# Patient Record
Sex: MALE | Race: AMERICAN INDIAN OR ALASKA NATIVE
[De-identification: names, ages, dates, MRNs, and addresses within clinical notes are randomized per-mention and may not be internally consistent; named-entity substitution may affect disease eponyms.]

---

## 2017-06-22 ENCOUNTER — HOSPITAL ENCOUNTER (EMERGENCY)
Dept: HOSPITAL 5 - ED | Age: 27
Discharge: HOME | End: 2017-06-22
Payer: MEDICARE

## 2017-06-22 VITALS — SYSTOLIC BLOOD PRESSURE: 116 MMHG | DIASTOLIC BLOOD PRESSURE: 66 MMHG

## 2017-06-22 DIAGNOSIS — F17.200: ICD-10-CM
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
## 2017-06-22 DIAGNOSIS — M54.5: Primary | ICD-10-CM
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
## 2017-06-22 DIAGNOSIS — M54.2: ICD-10-CM
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
## 2017-06-22 PROCEDURE — 99283 EMERGENCY DEPT VISIT LOW MDM: CPT

## 2017-06-22 PROCEDURE — 72100 X-RAY EXAM L-S SPINE 2/3 VWS: CPT

## 2017-06-22 PROCEDURE — 72040 X-RAY EXAM NECK SPINE 2-3 VW: CPT

## 2017-06-22 NOTE — XRAY REPORT
FINAL REPORT



EXAM:  XR SPINE CERVICAL 2-3V



HISTORY:  fall, neck pain 



COMPARISONS:  None.



FINDINGS:  

The three-view cervical spine 



There is straightening of the cervical spine. Vertebral body

heights intervertebral disc spaces are preserved. Prevertebral

soft tissues are within normal limits. No fractures. Incomplete

evaluation of the lung apices is unremarkable. 



IMPRESSION:  

Straightening of the cervical spine, which may be secondary to

muscular spasm or positioning. No fracture identified. Consider

MRI follow-up as warranted.

## 2017-06-22 NOTE — XRAY REPORT
FINAL REPORT



EXAM:  XR SPINE LUMBOSACRAL 2-3V



HISTORY:  fall, low middle back pain 



TECHNIQUE:  Three views lumbar spine 



PRIORS:  None.



FINDINGS:  

Lumbar lordosis is intact.  Vertebral body heights and

intervertebral disc spaces are preserved.  No listhesis,

spondylolysis or other fracture. 



IMPRESSION:  

Unremarkable lumbar spine radiographs.
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
## 2021-12-05 ENCOUNTER — HOSPITAL ENCOUNTER (EMERGENCY)
Dept: HOSPITAL 5 - ED | Age: 31
Discharge: HOME | End: 2021-12-05
Payer: MEDICARE

## 2021-12-05 VITALS — DIASTOLIC BLOOD PRESSURE: 61 MMHG | SYSTOLIC BLOOD PRESSURE: 124 MMHG

## 2021-12-05 DIAGNOSIS — R07.9: Primary | ICD-10-CM

## 2021-12-05 DIAGNOSIS — F17.200: ICD-10-CM

## 2021-12-05 LAB
ALBUMIN SERPL-MCNC: 4.2 G/DL (ref 3.9–5)
ALT SERPL-CCNC: 15 UNITS/L (ref 7–56)
BASOPHILS # (AUTO): 0 K/MM3 (ref 0–0.1)
BASOPHILS NFR BLD AUTO: 0.5 % (ref 0–1.8)
BUN SERPL-MCNC: 14 MG/DL (ref 9–20)
BUN/CREAT SERPL: 13 %
CALCIUM SERPL-MCNC: 9.4 MG/DL (ref 8.4–10.2)
EOSINOPHIL # BLD AUTO: 0 K/MM3 (ref 0–0.4)
EOSINOPHIL NFR BLD AUTO: 0.4 % (ref 0–4.3)
HCT VFR BLD CALC: 46.2 % (ref 35.5–45.6)
HEMOLYSIS INDEX: 39
HGB BLD-MCNC: 14.8 GM/DL (ref 11.8–15.2)
LYMPHOCYTES # BLD AUTO: 1.7 K/MM3 (ref 1.2–5.4)
LYMPHOCYTES NFR BLD AUTO: 34.7 % (ref 13.4–35)
MCHC RBC AUTO-ENTMCNC: 32 % (ref 32–34)
MCV RBC AUTO: 98 FL (ref 84–94)
MONOCYTES # (AUTO): 0.2 K/MM3 (ref 0–0.8)
MONOCYTES % (AUTO): 4.8 % (ref 0–7.3)
PLATELET # BLD: 246 K/MM3 (ref 140–440)
RBC # BLD AUTO: 4.7 M/MM3 (ref 3.65–5.03)

## 2021-12-05 PROCEDURE — 71046 X-RAY EXAM CHEST 2 VIEWS: CPT

## 2021-12-05 PROCEDURE — 84484 ASSAY OF TROPONIN QUANT: CPT

## 2021-12-05 PROCEDURE — 36415 COLL VENOUS BLD VENIPUNCTURE: CPT

## 2021-12-05 PROCEDURE — 80053 COMPREHEN METABOLIC PANEL: CPT

## 2021-12-05 PROCEDURE — 85025 COMPLETE CBC W/AUTO DIFF WBC: CPT

## 2021-12-05 PROCEDURE — 83880 ASSAY OF NATRIURETIC PEPTIDE: CPT

## 2021-12-05 PROCEDURE — 99284 EMERGENCY DEPT VISIT MOD MDM: CPT

## 2021-12-05 PROCEDURE — 85379 FIBRIN DEGRADATION QUANT: CPT

## 2021-12-05 PROCEDURE — 93005 ELECTROCARDIOGRAM TRACING: CPT

## 2021-12-05 NOTE — EMERGENCY DEPARTMENT REPORT
ED Chest Pain HPI





- General


Chief Complaint: Chest Pain


Stated Complaint: CHEST PAIN


Time Seen by Provider: 12/05/21 17:05


Source: patient


Mode of arrival: Ambulatory


Limitations: No Limitations





- History of Present Illness


Initial Comments: 





Patient is a 31-year-old male presents emergency room complaints of left-sided 

chest pain that began today while he was playing a video game.  He states he 

felt some tingling in his left arm.  He states it felt like a tightness 

sensation and he had some mild shortness of breath.  He denies any diaphoresis, 

nausea, vomiting, diarrhea, cough, hemoptysis, leg swelling, calf pain.  No past

medical history.  No allergies to medications.  He denies any tobacco use.  He 

endorses marijuana use.  He denies any family cardiac history.  He denies any 

recent travel, recent surgery, recent immobilization.





- Related Data


                                  Previous Rx's











 Medication  Instructions  Recorded  Last Taken  Type


 


Cyclobenzaprine [Flexeril] 10 mg PO BID PRN #20 tablet 06/22/17 Unknown Rx


 


Naproxen [Naprosyn TAB] 500 mg PO BID #20 tablet 06/22/17 Unknown Rx


 


traMADoL [Ultram 50 MG tab] 50 mg PO Q6HR PRN #20 tablet 06/22/17 Unknown Rx











                                    Allergies











Allergy/AdvReac Type Severity Reaction Status Date / Time


 


No Known Allergies Allergy   Verified 06/22/17 15:55














Heart Score





- HEART Score


History: Moderately suspicious


EKG: Normal


Age: < 45


Risk factors: 1-2 risk factors


Troponin: < normal limit


HEART Score: 2





- EKG Read Time


Time EKG Completed: 16:31


EKG Read Time: 16:42





ED Review of Systems


ROS: 


Stated complaint: CHEST PAIN


Other details as noted in HPI





Comment: All other systems reviewed and negative





ED Past Medical Hx





- Social History


Smoking Status: Current Some Day Smoker


Substance Use Type: Alcohol





- Medications


Home Medications: 


                                Home Medications











 Medication  Instructions  Recorded  Confirmed  Last Taken  Type


 


Cyclobenzaprine [Flexeril] 10 mg PO BID PRN #20 tablet 06/22/17  Unknown Rx


 


Naproxen [Naprosyn TAB] 500 mg PO BID #20 tablet 06/22/17  Unknown Rx


 


traMADoL [Ultram 50 MG tab] 50 mg PO Q6HR PRN #20 tablet 06/22/17  Unknown Rx














ED Physical Exam





- General


Limitations: No Limitations


General appearance: alert, in no apparent distress





- Head


Head exam: Present: atraumatic, normocephalic





- Eye


Eye exam: Present: normal appearance





- ENT


ENT exam: Present: mucous membranes moist





- Respiratory


Respiratory exam: Present: normal lung sounds bilaterally.  Absent: respiratory 

distress, wheezes, rales, rhonchi, stridor, chest wall tenderness, accessory 

muscle use, decreased breath sounds, prolonged expiratory





- Cardiovascular


Cardiovascular Exam: Present: regular rate, normal rhythm, normal heart sounds. 

Absent: systolic murmur, diastolic murmur, rubs, gallop





- Neurological Exam


Neurological exam: Present: alert, oriented X3





- Psychiatric


Psychiatric exam: Present: normal affect, normal mood





- Skin


Skin exam: Present: warm, dry, intact





ED Course


                                   Vital Signs











  12/05/21





  16:25


 


Temperature 98.4 F


 


Pulse Rate 65


 


Respiratory 20





Rate 


 


Blood Pressure 148/72





[Right] 


 


O2 Sat by Pulse 99





Oximetry 














ED Medical Decision Making





- Lab Data


Result diagrams: 


                                 12/05/21 17:35





                                 12/05/21 17:35








                                   Lab Results











  12/05/21 12/05/21 12/05/21 Range/Units





  17:35 17:35 17:35 


 


WBC  5.0    (4.5-11.0)  K/mm3


 


RBC  4.70    (3.65-5.03)  M/mm3


 


Hgb  14.8    (11.8-15.2)  gm/dl


 


Hct  46.2 H    (35.5-45.6)  %


 


MCV  98 H    (84-94)  fl


 


MCH  32    (28-32)  pg


 


MCHC  32    (32-34)  %


 


RDW  13.6    (13.2-15.2)  %


 


Plt Count  246    (140-440)  K/mm3


 


Lymph % (Auto)  34.7    (13.4-35.0)  %


 


Mono % (Auto)  4.8    (0.0-7.3)  %


 


Eos % (Auto)  0.4    (0.0-4.3)  %


 


Baso % (Auto)  0.5    (0.0-1.8)  %


 


Lymph # (Auto)  1.7    (1.2-5.4)  K/mm3


 


Mono # (Auto)  0.2    (0.0-0.8)  K/mm3


 


Eos # (Auto)  0.0    (0.0-0.4)  K/mm3


 


Baso # (Auto)  0.0    (0.0-0.1)  K/mm3


 


Seg Neutrophils %  59.6    (40.0-70.0)  %


 


Seg Neutrophils #  3.0    (1.8-7.7)  K/mm3


 


D-Dimer   < 135.00   (0-234)  ng/mlDDU


 


Sodium    139  (137-145)  mmol/L


 


Potassium    3.8  (3.6-5.0)  mmol/L


 


Chloride    104.9  ()  mmol/L


 


Carbon Dioxide    21 L  (22-30)  mmol/L


 


Anion Gap    17  mmol/L


 


BUN    14  (9-20)  mg/dL


 


Creatinine    1.1  (0.8-1.3)  mg/dL


 


Estimated GFR    > 60  ml/min


 


BUN/Creatinine Ratio    13  %


 


Glucose    107 H  ()  mg/dL


 


Calcium    9.4  (8.4-10.2)  mg/dL


 


Total Bilirubin    0.50  (0.1-1.2)  mg/dL


 


AST    21  (5-40)  units/L


 


ALT    15  (7-56)  units/L


 


Alkaline Phosphatase    58  ()  units/L


 


Troponin T    < 0.010  (0.00-0.029)  ng/mL


 


NT-Pro-B Natriuret Pep    17.64  (0-450)  pg/mL


 


Total Protein    7.4  (6.3-8.2)  g/dL


 


Albumin    4.2  (3.9-5)  g/dL


 


Albumin/Globulin Ratio    1.3  %














- EKG Data


EKG shows normal: sinus rhythm, axis, intervals, QRS complexes, ST-T waves


Rate: normal





- Radiology Data


Radiology results: report reviewed





Ordering Physician: TIESHA MORENO  


Date of Service: 12/05/21  


Procedure(s): XR chest routine 2V  


Accession Number(s): [id number]  


 


cc: TIESHA MORENO   


 


Fluoro Time In Minutes:   


 


CHEST 2 VIEWS   


 


 INDICATION / CLINICAL INFORMATION: Chest Pain.  


 


 COMPARISON: None available.  


 


 FINDINGS:  


 


 SUPPORT DEVICES: None.  


 HEART / MEDIASTINUM: No significant abnormality.   


 LUNGS / PLEURA: No significant pulmonary or pleural abnormality. No 

pneumothorax.   


 


 ADDITIONAL FINDINGS: No significant additional findings.  


 


 IMPRESSION:  


 1. No acute findings.  


 


 Signer Name: Brant Vincent MD   


 Signed: 12/5/2021 5:50 PM  


 Workstation Name: [id number]   


 


 


Transcribed By: DB  


Dictated By: BRANT VINCENT MD  


Electronically Authenticated By: BRANT VINCENT MD    


Signed Date/Time: 12/05/21 1750                                


 


 


 


DD/DT: 12/05/21 1750                                                            

  


TD/TT:








- Medical Decision Making





Patient is a 31-year-old male presents emergency room complaints of left-sided 

chest pain that began today while he was playing a video game.  He states he 

felt some tingling in his left arm.  He states it felt like a tightness sensati

on and he had some mild shortness of breath.  He denies any diaphoresis, nausea,

 vomiting, diarrhea, cough, hemoptysis, leg swelling, calf pain.  No past 

medical history.  No allergies to medications.  He denies any tobacco use.  He 

endorses marijuana use.  He denies any family cardiac history.  He denies any 

recent travel, recent surgery, recent immobilization. Vitals are stable. EKG is 

within normal notes. Labs are normal. Troponin is negative x2. D-dimer is 

negative, PERC criteria negative for PE. Heart score is 2, low risk for cardiac 

event. Chest x-ray with no acute process. Patient be referred to outpatient 

cardiology and primary care. Advised patient Please follow-up with your primary 

care doctor. Please follow-up with a cardiologist. Return to emergency room for 

any new or worsening symptoms.


Critical care attestation.: 


If time is entered above; I have spent that time in minutes in the direct care 

of this critically ill patient, excluding procedure time.








ED Disposition


Clinical Impression: 


Chest pain


Qualifiers:


 Chest pain type: unspecified Qualified Code(s): R07.9 - Chest pain, unspecified





Disposition: 01 HOME / SELF CARE / HOMELESS


Is pt being admited?: No


Does the pt Need Aspirin: Yes (given)


Condition: Stable


Instructions:  Nonspecific Chest Pain, Adult


Additional Instructions: 


Please follow-up with your primary care doctor. Please follow-up with a 

cardiologist. Return to emergency room for any new or worsening symptoms.


Referrals: 


PRIMARY CARE,MD [Primary Care Provider] - 2-3 Days


[facility] WADE MD [Staff Physician] - 2-3 Days


Time of Disposition: 21:24


Print Language: ENGLISH

## 2021-12-05 NOTE — XRAY REPORT
CHEST 2 VIEWS 



INDICATION / CLINICAL INFORMATION: Chest Pain.



COMPARISON: None available.



FINDINGS:



SUPPORT DEVICES: None.

HEART / MEDIASTINUM: No significant abnormality. 

LUNGS / PLEURA: No significant pulmonary or pleural abnormality. No pneumothorax. 



ADDITIONAL FINDINGS: No significant additional findings.



IMPRESSION:

1. No acute findings.



Signer Name: Jarvis Vincent MD 

Signed: 12/5/2021 5:50 PM

Workstation Name: [id number]

## 2021-12-06 NOTE — ELECTROCARDIOGRAPH REPORT
[facility]

                                       

Test Date:    2021               Test Time:    16:31:37

Pat Name:     CHRISTINE TODD          Department:   

Patient ID:   [id number]          Room:          

Gender:       M                        Technician:   BRAVO

:          1990               Requested By: JERRY RIVERA

Order Number: [id number]              Reading MD:   Ken Barlow

                                 Measurements

Intervals                              Axis          

Rate:         62                       P:            51

MD:           146                      QRS:          42

QRSD:         95                       T:            18

QT:           409                                    

QTc:          416                                    

                           Interpretive Statements

Sinus rhythm

No previous ECG available for comparison

Electronically Signed On 2021 11:42:24 EST by Ken Barlow